# Patient Record
Sex: FEMALE | Race: WHITE | Employment: UNEMPLOYED | URBAN - METROPOLITAN AREA
[De-identification: names, ages, dates, MRNs, and addresses within clinical notes are randomized per-mention and may not be internally consistent; named-entity substitution may affect disease eponyms.]

---

## 2020-05-14 ENCOUNTER — HOSPITAL ENCOUNTER (OUTPATIENT)
Dept: MRI IMAGING | Age: 60
Discharge: HOME | End: 2020-05-14
Attending: PHYSICAL MEDICINE & REHABILITATION
Payer: MEDICARE

## 2020-05-14 DIAGNOSIS — M54.50 LUMBAR BACK PAIN: ICD-10-CM

## 2020-05-14 PROCEDURE — 72148 MRI LUMBAR SPINE W/O DYE: CPT

## 2024-07-25 ENCOUNTER — OFFICE VISIT (OUTPATIENT)
Dept: ENDOCRINOLOGY | Age: 64
End: 2024-07-25

## 2024-07-25 VITALS — WEIGHT: 263 LBS | HEART RATE: 74 BPM | SYSTOLIC BLOOD PRESSURE: 124 MMHG | DIASTOLIC BLOOD PRESSURE: 84 MMHG

## 2024-07-25 DIAGNOSIS — Z80.8 FAMILY HISTORY OF THYROID CANCER: ICD-10-CM

## 2024-07-25 DIAGNOSIS — E04.2 MULTINODULAR GOITER: ICD-10-CM

## 2024-07-25 DIAGNOSIS — E04.2 MULTINODULAR GOITER: Primary | ICD-10-CM

## 2024-07-25 LAB — TSH W FREE THYROID IF ABNORMAL: 0.52 UIU/ML (ref 0.27–4.2)

## 2024-07-25 RX ORDER — LISINOPRIL AND HYDROCHLOROTHIAZIDE 20; 12.5 MG/1; MG/1
TABLET ORAL
COMMUNITY

## 2024-07-25 RX ORDER — ATORVASTATIN CALCIUM 40 MG/1
1 TABLET, FILM COATED ORAL DAILY
COMMUNITY
Start: 2022-08-02

## 2024-07-25 RX ORDER — OXYCODONE AND ACETAMINOPHEN 10; 325 MG/1; MG/1
TABLET ORAL
COMMUNITY

## 2024-07-25 ASSESSMENT — ENCOUNTER SYMPTOMS
DIARRHEA: 0
CONSTIPATION: 0

## 2024-07-25 NOTE — PROGRESS NOTES
Kee Harris MD, FACE  [facility]  [address] [place] [address], [address]  [place] [address]  Phone [phone]  Facsimile [phone]          Coni Nix is a 63 y.o. female seen 7/25/2024 at the request of Shayy Kang PA-C for the evaluation of multiple thyroid nodules and family history of thyroid cancer        ASSESSMENT AND PLAN:    1. Multinodular goiter  I performed a limited thyroid ultrasound in the office today and I did not measure the nodules as large as was reported on her outside thyroid ultrasound.  I elected to perform an FNA biopsy of the inferior right lobe nodule today.  The specimen will be sent to [facility] for cytology with [facility] sequencing  if needed.  Assuming the biopsy is benign, I will have her return in 1 year for a follow-up ultrasound to document stability.  She has no compressive symptoms at this point which would warrant referral for thyroidectomy.  I will assess her thyroid function today.    2. Family history of thyroid cancer  She has a positive family history of thyroid carcinoma in her mother (unknown type).            Procedures:    Limited thyroid ultrasound 7/25/2024: In the mid right lobe posteriorly there is a complex isoechoic nodule measuring 0.72 x 1.12 x 1.14 cm (TR 2).  In the inferior right lobe there is a solid hypoechoic nodule measuring 0.97 x 1.29 x 1.32 cm without microcalcifications (TR 4).  In the superior left lobe there is a solid hypoechoic nodule measuring 0.68 x 0.83 cm (TR 4).  In the mid left lobe anteriorly there is a solid hypoechoic nodule measuring 0.60 x 0.56 x 1.13 cm without microcalcifications (TR 4).      Thyroid FNA Biopsy Procedure Note:    Informed consent was obtained from the patient.  I explained the small risk of bleeding and infection.  A timeout was performed.  The neck was cleansed using alcohol pads.  The skin was anesthetized using 1% lidocaine.  5 passes with a

## 2024-07-31 ENCOUNTER — TELEPHONE (OUTPATIENT)
Dept: ENDOCRINOLOGY | Age: 64
End: 2024-07-31

## 2024-07-31 NOTE — TELEPHONE ENCOUNTER
Her thyroid biopsy did not show any malignant cells, which is great news.  I will see her at her next visit.

## 2025-03-03 ENCOUNTER — INITIAL CONSULT (OUTPATIENT)
Age: 65
End: 2025-03-03
Payer: MEDICARE

## 2025-03-03 VITALS
HEIGHT: 62 IN | SYSTOLIC BLOOD PRESSURE: 130 MMHG | DIASTOLIC BLOOD PRESSURE: 70 MMHG | HEART RATE: 68 BPM | WEIGHT: 233 LBS | BODY MASS INDEX: 42.88 KG/M2

## 2025-03-03 DIAGNOSIS — R01.1 MURMUR, CARDIAC: ICD-10-CM

## 2025-03-03 DIAGNOSIS — R07.2 PRECORDIAL PAIN: Primary | ICD-10-CM

## 2025-03-03 PROCEDURE — G8417 CALC BMI ABV UP PARAM F/U: HCPCS | Performed by: INTERNAL MEDICINE

## 2025-03-03 PROCEDURE — G8428 CUR MEDS NOT DOCUMENT: HCPCS | Performed by: INTERNAL MEDICINE

## 2025-03-03 PROCEDURE — 1036F TOBACCO NON-USER: CPT | Performed by: INTERNAL MEDICINE

## 2025-03-03 PROCEDURE — 99203 OFFICE O/P NEW LOW 30 MIN: CPT | Performed by: INTERNAL MEDICINE

## 2025-03-03 PROCEDURE — 3017F COLORECTAL CA SCREEN DOC REV: CPT | Performed by: INTERNAL MEDICINE

## 2025-03-03 RX ORDER — TIRZEPATIDE 7.5 MG/.5ML
INJECTION, SOLUTION SUBCUTANEOUS
COMMUNITY
Start: 2025-02-10

## 2025-03-03 RX ORDER — CYANOCOBALAMIN 1000 UG/ML
1000 INJECTION, SOLUTION INTRAMUSCULAR; SUBCUTANEOUS ONCE
COMMUNITY

## 2025-03-04 NOTE — PROGRESS NOTES
reactive to light.   Cardiovascular:      Rate and Rhythm: Normal rate.      Heart sounds: Normal heart sounds.   Pulmonary:      Effort: Pulmonary effort is normal.      Breath sounds: Normal breath sounds.   Abdominal:      General: Abdomen is flat.      Palpations: Abdomen is soft. There is no mass.   Musculoskeletal:         General: Normal range of motion.      Cervical back: Normal range of motion.   Skin:     General: Skin is warm and dry.   Neurological:      General: No focal deficit present.      Mental Status: She is alert and oriented to person, place, and time.   Psychiatric:         Mood and Affect: Mood normal.               RECENT LABS AND RECORDS REVIEW          No results found for any visits on 03/03/25.   ASSESSMENT and PLAN     Chest pain  Atypical. Check nuke      Return for will schedule after procedure.         RAIN DELACRUZ MD  3/3/2025  10:41 AM

## 2025-03-07 RX ORDER — SODIUM CHLORIDE 0.9 % (FLUSH) 0.9 %
5-40 SYRINGE (ML) INJECTION EVERY 12 HOURS SCHEDULED
OUTPATIENT
Start: 2025-03-07

## 2025-03-07 RX ORDER — SODIUM CHLORIDE 0.9 % (FLUSH) 0.9 %
5-40 SYRINGE (ML) INJECTION PRN
OUTPATIENT
Start: 2025-03-07

## 2025-03-07 RX ORDER — SODIUM CHLORIDE 9 MG/ML
INJECTION, SOLUTION INTRAVENOUS PRN
OUTPATIENT
Start: 2025-03-07

## 2025-03-07 RX ORDER — ASPIRIN 81 MG/1
81 TABLET, CHEWABLE ORAL ONCE
OUTPATIENT
Start: 2025-03-07 | End: 2025-03-07

## 2025-03-07 RX ORDER — SODIUM CHLORIDE 9 MG/ML
INJECTION, SOLUTION INTRAVENOUS CONTINUOUS
OUTPATIENT
Start: 2025-03-07

## 2025-03-10 PROBLEM — R07.2 PRECORDIAL CHEST PAIN: Status: ACTIVE | Noted: 2025-03-03

## 2025-03-26 NOTE — PROGRESS NOTES
Patient pre-assessment complete for [facility] scheduled for 3/27, arrival time 0600. Patient verified using . Patient instructed to bring a list of all home medications on the day of procedure. NPO status reinforced. Patient informed to take a full dose aspirin 325mg  or 81 mg x 4 on the day of procedure. Patient instructed to HOLD lisinopril-hctz. Instructed they can take all other medications excluding vitamins & supplements. Patient verbalizes understanding of all instructions & denies any questions at this time.

## 2025-03-27 ENCOUNTER — HOSPITAL ENCOUNTER (OUTPATIENT)
Age: 65
Setting detail: OUTPATIENT SURGERY
Discharge: HOME | End: 2025-03-27
Attending: INTERNAL MEDICINE | Admitting: INTERNAL MEDICINE
Payer: MEDICARE

## 2025-03-27 VITALS
HEIGHT: 64 IN | TEMPERATURE: 97.8 F | OXYGEN SATURATION: 99 % | WEIGHT: 233 LBS | SYSTOLIC BLOOD PRESSURE: 120 MMHG | RESPIRATION RATE: 15 BRPM | BODY MASS INDEX: 39.78 KG/M2 | HEART RATE: 64 BPM | DIASTOLIC BLOOD PRESSURE: 80 MMHG

## 2025-03-27 DIAGNOSIS — R07.2 PRECORDIAL CHEST PAIN: ICD-10-CM

## 2025-03-27 LAB
ANION GAP SERPL CALC-SCNC: 10 MMOL/L (ref 7–16)
BUN SERPL-MCNC: 11 MG/DL (ref 8–23)
CALCIUM SERPL-MCNC: 8.9 MG/DL (ref 8.8–10.2)
CHLORIDE SERPL-SCNC: 105 MMOL/L (ref 98–107)
CO2 SERPL-SCNC: 25 MMOL/L (ref 20–29)
CREAT SERPL-MCNC: 0.95 MG/DL (ref 0.6–1.1)
ECHO BSA: 2.18 M2
EKG ATRIAL RATE: 68 BPM
EKG DIAGNOSIS: NORMAL
EKG P AXIS: 50 DEGREES
EKG P-R INTERVAL: 170 MS
EKG Q-T INTERVAL: 384 MS
EKG QRS DURATION: 86 MS
EKG QTC CALCULATION (BAZETT): 408 MS
EKG R AXIS: 39 DEGREES
EKG T AXIS: 59 DEGREES
EKG VENTRICULAR RATE: 68 BPM
ERYTHROCYTE [DISTWIDTH] IN BLOOD BY AUTOMATED COUNT: 13.5 % (ref 11.9–14.6)
GLUCOSE SERPL-MCNC: 92 MG/DL (ref 70–99)
HCT VFR BLD AUTO: 42.7 % (ref 35.8–46.3)
HGB BLD-MCNC: 14 G/DL (ref 11.7–15.4)
MCH RBC QN AUTO: 27.8 PG (ref 26.1–32.9)
MCHC RBC AUTO-ENTMCNC: 32.8 G/DL (ref 31.4–35)
MCV RBC AUTO: 84.7 FL (ref 82–102)
NRBC # BLD: 0 K/UL (ref 0–0.2)
PLATELET # BLD AUTO: 207 K/UL (ref 150–450)
PMV BLD AUTO: 11 FL (ref 9.4–12.3)
POTASSIUM SERPL-SCNC: 4.1 MMOL/L (ref 3.5–5.1)
RBC # BLD AUTO: 5.04 M/UL (ref 4.05–5.2)
SODIUM SERPL-SCNC: 140 MMOL/L (ref 136–145)
WBC # BLD AUTO: 6.7 K/UL (ref 4.3–11.1)

## 2025-03-27 PROCEDURE — 99152 MOD SED SAME PHYS/QHP 5/>YRS: CPT | Performed by: INTERNAL MEDICINE

## 2025-03-27 PROCEDURE — 6360000002 HC RX W HCPCS: Performed by: INTERNAL MEDICINE

## 2025-03-27 PROCEDURE — 85027 COMPLETE CBC AUTOMATED: CPT

## 2025-03-27 PROCEDURE — C1894 INTRO/SHEATH, NON-LASER: HCPCS | Performed by: INTERNAL MEDICINE

## 2025-03-27 PROCEDURE — 93458 L HRT ARTERY/VENTRICLE ANGIO: CPT | Performed by: INTERNAL MEDICINE

## 2025-03-27 PROCEDURE — 93005 ELECTROCARDIOGRAM TRACING: CPT | Performed by: INTERNAL MEDICINE

## 2025-03-27 PROCEDURE — 2709999900 HC NON-CHARGEABLE SUPPLY: Performed by: INTERNAL MEDICINE

## 2025-03-27 PROCEDURE — 80048 BASIC METABOLIC PNL TOTAL CA: CPT

## 2025-03-27 PROCEDURE — C1769 GUIDE WIRE: HCPCS | Performed by: INTERNAL MEDICINE

## 2025-03-27 PROCEDURE — 2500000003 HC RX 250 WO HCPCS: Performed by: INTERNAL MEDICINE

## 2025-03-27 PROCEDURE — 93010 ELECTROCARDIOGRAM REPORT: CPT | Performed by: INTERNAL MEDICINE

## 2025-03-27 RX ORDER — HEPARIN SODIUM 200 [USP'U]/100ML
INJECTION, SOLUTION INTRAVENOUS CONTINUOUS PRN
Status: COMPLETED | OUTPATIENT
Start: 2025-03-27 | End: 2025-03-27

## 2025-03-27 RX ORDER — LIDOCAINE HYDROCHLORIDE 10 MG/ML
INJECTION, SOLUTION INFILTRATION; PERINEURAL PRN
Status: DISCONTINUED | OUTPATIENT
Start: 2025-03-27 | End: 2025-03-27 | Stop reason: HOSPADM

## 2025-03-27 RX ORDER — PANTOPRAZOLE SODIUM 40 MG/1
40 TABLET, DELAYED RELEASE ORAL DAILY
COMMUNITY

## 2025-03-27 RX ORDER — MIDAZOLAM HYDROCHLORIDE 1 MG/ML
INJECTION, SOLUTION INTRAMUSCULAR; INTRAVENOUS PRN
Status: DISCONTINUED | OUTPATIENT
Start: 2025-03-27 | End: 2025-03-27 | Stop reason: HOSPADM

## 2025-03-27 RX ORDER — ASPIRIN 81 MG/1
324 TABLET, CHEWABLE ORAL ONCE
Status: DISCONTINUED | OUTPATIENT
Start: 2025-03-27 | End: 2025-03-27 | Stop reason: HOSPADM

## 2025-03-27 NOTE — H&P
25           NAME:  Coni Nix  : 1960  MRN: [id number]      CHIEF COMPLAINT:     Chest pain        SUBJECTIVE:      65 yo female referred by Pcp for fu ER visit for chest pain. No dx, rx and no fu.  Needs extensive foot sx tomorrow.  Needs clearance.  Chest pain x sev months  Vice like squeezing discomfort left precordium.  At least three episodes.  + sob.     Phx:  Fibromyalgia'  Chronic pain w/ spinal stenosis  Pre diabetic  Dyslipidemia  Glaucoma     Fhx:  + cad, mother w/ cabg     Shx:  Non smoker     Ros:  NC         Medications were all reviewed with the patient today and updated as necessary.           Current Outpatient Medications   Medication Sig    MOUNJARO 7.5 MG/0.5ML SOAJ INJECT 1 SYRINGE SUBCUTANEOUSLY ONCE A WEEK    cyanocobalamin 1000 MCG/ML injection Inject 1 mL into the muscle once    ERGOCALCIFEROL PO Take 1 capsule by mouth Twice a Week    atorvastatin (LIPITOR) 40 MG tablet Take 1 tablet by mouth daily    lisinopril-hydroCHLOROthiazide (PRINZIDE;ZESTORETIC) 20-12.5 MG per tablet Take 1 tablet by mouth daily    oxyCODONE-acetaminophen (PERCOCET)  MG per tablet TAKE 1 TABLET BY MOUTH 4 TIMES DAILY AS NEEDED      No current facility-administered medications for this visit.         Allergies             Allergies   Allergen Reactions    Adhesive Tape Itching       Skin blisters                  PHYSICAL EXAM:           Wt Readings from Last 3 Encounters:   25 105.7 kg (233 lb)   24 119.3 kg (263 lb)            BP Readings from Last 3 Encounters:   25 130/70   24 124/84         /70   Pulse 68   Ht 1.575 m (5' 2\")   Wt 105.7 kg (233 lb)   BMI 42.62 kg/m²      Physical Exam  Vitals reviewed.   HENT:      Head: Normocephalic and atraumatic.   Eyes:      Extraocular Movements: Extraocular movements intact.      Pupils: Pupils are equal, round, and reactive to light.   Cardiovascular:      Rate and Rhythm: Normal rate.      Heart sounds: Normal

## 2025-03-27 NOTE — PROGRESS NOTES
TRANSFER - OUT REPORT:    Verbal report given to RN on Coni Hook Nix  being transferred to CPRU for routine progression of patient care       Report consisted of patient's Situation, Background, Assessment and   Recommendations(SBAR).     Information from the following report(s) Nurse Handoff Report was reviewed with the receiving nurse.    [facility] w/ Dr. Cruz  No interventions  R radial access  TR band to R radial @ 12mL  No s/sxs of bleeding or hematoma to R radial access site    Heparin 5000u IV  Versed 2mg IV

## 2025-03-27 NOTE — PROGRESS NOTES
TRANSFER - IN REPORT:    Verbal report received from REAL [place] on Coni Ann Hook Nix being received from [facility] for routine post-op      Report consisted of patient’s Situation, Background, Assessment and Recommendations(SBAR).     Information from the following report(s) Procedure Summary was reviewed with the receiving nurse.    Opportunity for questions and clarification was provided.      Assessment completed upon patient’s arrival to unit and care assumed.

## 2025-03-27 NOTE — PROGRESS NOTES
Patient received to CPRU room # 11  Ambulatory from [facility]. Patient scheduled for C today with Dr Cruz. Procedure reviewed & questions answered, voiced good understanding consent obtained & placed on chart. All medications and medical history reviewed. Will prep patient per orders. Patient & family updated on plan of care.      The patient has a fraility score of 4-VULNERABLE, based on use of can for ADLs.     Pt took ASA 324mg at 0500.

## 2025-07-28 ENCOUNTER — OFFICE VISIT (OUTPATIENT)
Dept: ENDOCRINOLOGY | Age: 65
End: 2025-07-28
Payer: MEDICARE

## 2025-07-28 VITALS
DIASTOLIC BLOOD PRESSURE: 68 MMHG | WEIGHT: 221 LBS | OXYGEN SATURATION: 98 % | BODY MASS INDEX: 37.73 KG/M2 | HEIGHT: 64 IN | SYSTOLIC BLOOD PRESSURE: 108 MMHG | HEART RATE: 72 BPM | RESPIRATION RATE: 18 BRPM

## 2025-07-28 DIAGNOSIS — E04.2 MULTINODULAR GOITER: ICD-10-CM

## 2025-07-28 DIAGNOSIS — Z80.8 FAMILY HISTORY OF THYROID CANCER: ICD-10-CM

## 2025-07-28 DIAGNOSIS — E04.2 MULTINODULAR GOITER: Primary | ICD-10-CM

## 2025-07-28 LAB
T4 FREE SERPL-MCNC: 1.3 NG/DL (ref 0.9–1.7)
TSH W FREE THYROID IF ABNORMAL: 0.08 UIU/ML (ref 0.27–4.2)

## 2025-07-28 PROCEDURE — 76536 US EXAM OF HEAD AND NECK: CPT | Performed by: INTERNAL MEDICINE

## 2025-07-28 PROCEDURE — 1036F TOBACCO NON-USER: CPT | Performed by: INTERNAL MEDICINE

## 2025-07-28 PROCEDURE — G8427 DOCREV CUR MEDS BY ELIG CLIN: HCPCS | Performed by: INTERNAL MEDICINE

## 2025-07-28 PROCEDURE — G8417 CALC BMI ABV UP PARAM F/U: HCPCS | Performed by: INTERNAL MEDICINE

## 2025-07-28 PROCEDURE — 3017F COLORECTAL CA SCREEN DOC REV: CPT | Performed by: INTERNAL MEDICINE

## 2025-07-28 PROCEDURE — 99213 OFFICE O/P EST LOW 20 MIN: CPT | Performed by: INTERNAL MEDICINE

## 2025-07-28 ASSESSMENT — ENCOUNTER SYMPTOMS
CONSTIPATION: 0
DIARRHEA: 0

## 2025-07-28 NOTE — PROGRESS NOTES
Kee Harris MD, [facility]  [address] [place] [address], [address]  [place] [address]  Phone [phone]  Facsimile [phone]          Coni Nix is a 64 y.o. female seen 7/28/2025 for follow-up of multinodular goiter        ASSESSMENT AND PLAN:    1. Multinodular goiter  Status post FNA biopsy of the inferior right lobe nodule 7/2024 with cytology favoring benign.  Thyroid ultrasound performed today revealed that the nodules were stable in size.  I will have her return in 1 year for a follow-up ultrasound to document stability.  She has no significant compressive symptoms at this point which would warrant referral for thyroidectomy.  I will assess her thyroid function today.    2. Family history of thyroid cancer  She has a positive family history of thyroid carcinoma in her mother (unknown type).            Procedures:    Thyroid ultrasound 7/28/2025: Right lobe 1.87 x 2.10 x 4.25 cm.  There are multiple subcentimeter hypoechoic nodules scattered throughout the right lobe.  In the mid right lobe anteriorly there is a hypoechoic nodule measuring 0.60 x 0.79 x 0.89 cm (TR 4).  In the inferior right lobe posteriorly there is a hypoechoic nodule measuring 0.95 x 1.20 x 1.25 cm without microcalcifications (TR 4).  Isthmus measures 0.35 cm.  Left lobe 1.74 x 1.70 x 4.90 cm.  There are multiple subcentimeter hypoechoic nodules scattered throughout the left lobe.  In the superior left lobe there is a hypoechoic nodule measuring 0.40 x 0.54 cm (TR 4).  In the mid left lobe laterally there is a hypoechoic nodule measuring 0.67 x 0.79 x 0.97 cm (TR 4).  In the mid left lobe anteriorly there is a hypoechoic nodule measuring 0.68 x 0.75 x 1.10 cm without microcalcifications (TR 4).      Follow-up and Dispositions    Return in about 1 year (around 7/28/2026).         HISTORY OF PRESENT ILLNESS:    THYROID NODULE / MULTINODULAR GOITER    Presentation: Incidentally

## 2025-07-29 ENCOUNTER — RESULTS FOLLOW-UP (OUTPATIENT)
Dept: ENDOCRINOLOGY | Age: 65
End: 2025-07-29

## 2025-07-29 DIAGNOSIS — R94.6 ABNORMAL THYROID FUNCTION TEST: Primary | ICD-10-CM

## 2025-07-29 NOTE — TELEPHONE ENCOUNTER
Her thyroid levels were abnormal  and indicate that her thyroid gland is mildly over active.  I would like for her to repeat her labs in 6 weeks.

## (undated) DEVICE — CATHETER COR DIAG JUDKINS R 5.0 CRV 5FR 100CM 0 SIDE H

## (undated) DEVICE — COMPRESSION DEVICE 24 CM 10 ML RIGHT

## (undated) DEVICE — CATHETER DIAG 5FR L100CM LUMN ID0.047IN JL3.5 CRV 0 SIDE H

## (undated) DEVICE — CATHETER DIAG AD 5FR L110CM 145DEG COR GRY HYDRPHLC NYL

## (undated) DEVICE — GUIDEWIRE 035IN 210CM PTFE COAT FIX COR J TIP 15MM FIRM BODY

## (undated) DEVICE — GLIDESHEATH SLENDER STAINLESS STEEL KIT: Brand: GLIDESHEATH SLENDER